# Patient Record
Sex: MALE | Race: WHITE | NOT HISPANIC OR LATINO | Employment: PART TIME | URBAN - METROPOLITAN AREA
[De-identification: names, ages, dates, MRNs, and addresses within clinical notes are randomized per-mention and may not be internally consistent; named-entity substitution may affect disease eponyms.]

---

## 2021-03-12 ENCOUNTER — APPOINTMENT (OUTPATIENT)
Dept: ULTRASOUND IMAGING | Facility: HOSPITAL | Age: 21
End: 2021-03-12

## 2021-03-12 ENCOUNTER — HOSPITAL ENCOUNTER (EMERGENCY)
Facility: HOSPITAL | Age: 21
Discharge: HOME | End: 2021-03-12
Admitting: EMERGENCY MEDICINE

## 2021-03-12 VITALS
SYSTOLIC BLOOD PRESSURE: 133 MMHG | DIASTOLIC BLOOD PRESSURE: 70 MMHG | WEIGHT: 198.19 LBS | BODY MASS INDEX: 28.37 KG/M2 | OXYGEN SATURATION: 99 % | TEMPERATURE: 97 F | RESPIRATION RATE: 20 BRPM | HEART RATE: 80 BPM | HEIGHT: 70 IN

## 2021-03-12 DIAGNOSIS — N50.812 PAIN IN BOTH TESTICLES: Primary | ICD-10-CM

## 2021-03-12 DIAGNOSIS — N43.3 HYDROCELE, BILATERAL: ICD-10-CM

## 2021-03-12 DIAGNOSIS — N50.811 PAIN IN BOTH TESTICLES: Primary | ICD-10-CM

## 2021-03-12 LAB
BILIRUB UR QL STRIP: NEGATIVE
CLARITY UR: CLEAR
COLOR UR: YELLOW
GLUCOSE UR STRIP-MCNC: NEGATIVE MG/DL
HGB UR QL STRIP.AUTO: NEGATIVE
KETONES UR QL STRIP: NEGATIVE
LEUKOCYTE ESTERASE UR QL STRIP.AUTO: NEGATIVE
NITRITE UR QL STRIP: NEGATIVE
PH UR STRIP.AUTO: 6.5 [PH] (ref 5–8)
PROT UR QL STRIP: NEGATIVE
SP GR UR STRIP: <=1.005 (ref 1–1.03)
UROBILINOGEN UR QL STRIP: NORMAL

## 2021-03-12 PROCEDURE — 99283 EMERGENCY DEPT VISIT LOW MDM: CPT

## 2021-03-12 PROCEDURE — 93976 VASCULAR STUDY: CPT

## 2021-03-12 PROCEDURE — 81003 URINALYSIS AUTO W/O SCOPE: CPT | Performed by: NURSE PRACTITIONER

## 2021-03-12 PROCEDURE — 76870 US EXAM SCROTUM: CPT

## 2021-03-13 NOTE — ED PROVIDER NOTES
Subjective   History: Patient is a 20-year-old male complains of bilateral scrotal pain for the last 2 days.  Reports its usually worse when he wakes up in the a.m. and more swollen improves throughout the day but then tonight states the pain was worse.  Denies any trauma.  Took Tylenol approximately 2 hours prior to arrival.  Reports it is a heaviness, nonradiating.  Has had some nausea with it.  Denies any history of testicular torsion.  No fever chills vomiting abdominal pain.  No penile discharge urinary frequency urgency or burning.      Onset: 2 days  Location: Bilateral scrotal  Duration: Waxes and wanes  Character: Edema  Aggravating/Alleviating factors: Worse when waking up in a.m.  Radiation nonradiating  Severity: Mild            Review of Systems   Constitutional: Negative for chills, fatigue and fever.   HENT: Negative for congestion, sore throat, tinnitus and trouble swallowing.    Eyes: Negative for photophobia, discharge and visual disturbance.   Respiratory: Negative for cough and shortness of breath.    Cardiovascular: Negative for chest pain.   Gastrointestinal: Negative for abdominal pain, diarrhea, nausea and vomiting.   Genitourinary: Positive for scrotal swelling and testicular pain. Negative for discharge, dysuria, frequency, hematuria, penile pain, penile swelling and urgency.   Musculoskeletal: Negative for back pain and myalgias.   Skin: Negative for rash.   Neurological: Negative for dizziness and headaches.   Psychiatric/Behavioral: Negative for confusion.       History reviewed. No pertinent past medical history.    No Known Allergies    History reviewed. No pertinent surgical history.    History reviewed. No pertinent family history.    Social History     Socioeconomic History   • Marital status: Single     Spouse name: Not on file   • Number of children: Not on file   • Years of education: Not on file   • Highest education level: Not on file   Tobacco Use   • Smoking status: Never  "Smoker   Substance and Sexual Activity   • Alcohol use: Never   • Drug use: Never           Objective   Physical Exam  Vitals reviewed. Exam conducted with a chaperone present.   Constitutional:       General: He is not in acute distress.     Appearance: Normal appearance. He is normal weight. He is not toxic-appearing.   HENT:      Head: Normocephalic and atraumatic.   Eyes:      Pupils: Pupils are equal, round, and reactive to light.   Cardiovascular:      Rate and Rhythm: Normal rate.      Pulses: Normal pulses.      Heart sounds: Normal heart sounds. No murmur.   Pulmonary:      Effort: Pulmonary effort is normal.      Breath sounds: Normal breath sounds.   Abdominal:      General: Abdomen is flat. Bowel sounds are normal. There is no distension.      Palpations: Abdomen is soft.      Tenderness: There is no abdominal tenderness. There is no guarding or rebound.   Genitourinary:     Pubic Area: No rash.       Penis: Normal.       Testes: Cremasteric reflex is present.         Right: Tenderness present. Swelling not present. Cremasteric reflex is present.          Left: Tenderness present. Swelling not present. Cremasteric reflex is present.       Comments: Subjective swelling per patient.  No erythema visible edema induration or fluctuation.  Tender on palpation right worse than left.  Musculoskeletal:         General: Normal range of motion.      Cervical back: Normal range of motion.   Skin:     General: Skin is warm and dry.      Findings: No rash.   Neurological:      Mental Status: He is alert and oriented to person, place, and time.   Psychiatric:         Mood and Affect: Mood normal.         Behavior: Behavior normal.         Thought Content: Thought content normal.         Judgment: Judgment normal.         Procedures           ED Course    /91 (BP Location: Left arm, Patient Position: Sitting)   Pulse 73   Temp 98.2 °F (36.8 °C) (Oral)   Resp 18   Ht 177.8 cm (70\")   Wt 89.9 kg (198 lb 3.1 " oz)   SpO2 100%   BMI 28.44 kg/m²   Labs Reviewed   URINALYSIS W/ CULTURE IF INDICATED - Normal    Narrative:     Urine microscopic not indicated.     Medications - No data to display  US Scrotum & Testicles    Result Date: 3/12/2021   1.  Small bilateral hydroceles. 2.  No evidence of intratesticular mass lesion.  No evidence of testicular torsion.  Electronically Signed By-Ramone Arellano MD On:3/12/2021 8:39 PM This report was finalized on [id number] by  Ramone Arellano MD.                                           MDM     I examined the patient using the appropriate personal protective equipment.      DISPOSITION:   Chart Review:  Comorbidity:  has no past medical history on file.    Labs: Urinalysis unremarkable    Imaging:   US Scrotum & Testicles    Result Date: 3/12/2021   1.  Small bilateral hydroceles. 2.  No evidence of intratesticular mass lesion.  No evidence of testicular torsion.  Electronically Signed By-Ramone Arellano MD On:3/12/2021 8:39 PM This report was finalized on [id number] by  Ramone Arellano MD.      Disposition/Treatment:  Urinalysis unremarkable.  Patient refused pain medication while in ER.  Ultrasound of scrotum revealed small bilateral hydroceles, likely idiopathic as patient denies any recent trauma, no infection present.  Discussed scrotal support ibuprofen for discomfort and follow-up with urology.  Patient and father at bedside agreeable to plan of care.    Prescription: None    Final diagnoses:   Pain in both testicles   Hydrocele, bilateral            Ivonne Charles, APRN  03/12/21 2184

## 2021-03-13 NOTE — ED NOTES
"Pt is c/o bilateral testicular pain that he states feels like \"heaviness\" that has been going on for about a day and is also tender to the touch. Pt is not c/o any other symptoms at this time.      Sandy Sharma, RN  03/12/21 2036    "

## 2021-03-13 NOTE — DISCHARGE INSTRUCTIONS
Use scrotal support and ibuprofen for discomfort.  Follow-up with urology   Natanael Carlin(Attending)

## 2021-03-29 ENCOUNTER — OFFICE VISIT (OUTPATIENT)
Dept: CARDIOLOGY | Facility: CLINIC | Age: 21
End: 2021-03-29

## 2021-03-29 VITALS
BODY MASS INDEX: 27.77 KG/M2 | WEIGHT: 194 LBS | OXYGEN SATURATION: 99 % | HEIGHT: 70 IN | DIASTOLIC BLOOD PRESSURE: 87 MMHG | SYSTOLIC BLOOD PRESSURE: 152 MMHG | HEART RATE: 73 BPM

## 2021-03-29 DIAGNOSIS — I10 ESSENTIAL HYPERTENSION: ICD-10-CM

## 2021-03-29 DIAGNOSIS — R00.2 PALPITATIONS: Primary | ICD-10-CM

## 2021-03-29 DIAGNOSIS — R55 NEAR SYNCOPE: ICD-10-CM

## 2021-03-29 DIAGNOSIS — R53.83 OTHER FATIGUE: ICD-10-CM

## 2021-03-29 PROCEDURE — 99204 OFFICE O/P NEW MOD 45 MIN: CPT | Performed by: INTERNAL MEDICINE

## 2021-03-29 NOTE — PROGRESS NOTES
Date of Office Visit: 2021  Encounter Provider: Dr. Momo Thompson    Place of Service: [facility] [place]  Patient Name: Brendon Joseph  :2000  Cristin Rolle MD    Chief Complaint   Patient presents with   • Consult   • Palpitations   • Syncope   • Fatigue   • Hypertension     History of Present Illness    I am pleased to see Mr. Lara in my office today as a new consultation    As you know, patient is 20 years old white gentleman whose past medical history is insignificant for any medical illness who is referred to me for symptom of dizziness and headache.    Patient reports that about a week ago he started having headaches and also feeling not well.  He felt dizzy.  Patient complains of fatigue and lack of energy.  Patient denies any chest pain.  Patient denies any orthopnea or PND or palpitation.  Patient does state that he is feeling thumping in the chest but heart is not racing.    Patient does not smoke or abuse alcohol.  No previous coronary artery disease or congenital heart disease.  No medical illness    EKG done in PCP office was normal.    I believe that his symptoms are probably due to high blood pressure.  However he never had high blood pressure previously.  His blood pressure 2 weeks ago was normal.  Before I suggest that patient should start antihypertensive, I would recommend more blood pressure readings.  I advised him to monitor the blood pressure and come to my office in 1 month.  I would also proceed with echocardiogram        Past Medical History:   Diagnosis Date   • Essential hypertension 3/29/2021   • Near syncope 3/29/2021   • Other fatigue 3/29/2021   • Palpitations 3/29/2021         History reviewed. No pertinent surgical history.        Current Outpatient Medications:   •  Acetaminophen (Tylenol) 325 MG capsule, Take  by mouth., Disp: , Rfl:       Social History     Socioeconomic History   • Marital status: Single     Spouse name: Not on file   • Number of  "children: Not on file   • Years of education: Not on file   • Highest education level: Not on file   Tobacco Use   • Smoking status: Never Smoker   • Smokeless tobacco: Never Used   Vaping Use   • Vaping Use: Never used   Substance and Sexual Activity   • Alcohol use: Never   • Drug use: Never   • Sexual activity: Defer         Review of Systems   Constitutional: Negative for chills and fever.   HENT: Negative for ear discharge and nosebleeds.    Eyes: Negative for discharge and redness.   Cardiovascular: Negative for chest pain, orthopnea, palpitations, paroxysmal nocturnal dyspnea and syncope.   Respiratory: Positive for shortness of breath. Negative for cough and wheezing.    Endocrine: Negative for heat intolerance.   Skin: Negative for rash.   Musculoskeletal: Negative for arthritis and myalgias.   Gastrointestinal: Negative for abdominal pain, melena, nausea and vomiting.   Genitourinary: Negative for dysuria and hematuria.   Neurological: Negative for dizziness, light-headedness, numbness and tremors.   Psychiatric/Behavioral: Negative for depression. The patient is not nervous/anxious.        Procedures    Procedures    No orders to display           Objective:    /87   Pulse 73   Ht 177.8 cm (70\")   Wt 88 kg (194 lb)   SpO2 99%   BMI 27.84 kg/m²         Constitutional:       Appearance: Well-developed.   Eyes:      General: No scleral icterus.        Right eye: No discharge.   HENT:      Head: Normocephalic and atraumatic.   Neck:      Thyroid: No thyromegaly.      Lymphadenopathy: No cervical adenopathy.   Pulmonary:      Effort: Pulmonary effort is normal. No respiratory distress.      Breath sounds: Normal breath sounds. No wheezing. No rales.   Cardiovascular:      Normal rate. Regular rhythm.      No gallop.   Abdominal:      Tenderness: There is no abdominal tenderness.   Skin:     Findings: No erythema or rash.   Neurological:      Mental Status: Alert and oriented to person, place, and " time.             Assessment:       Diagnosis Plan   1. Palpitations     2. Other fatigue     3. Near syncope     4. Essential hypertension              Plan:       MDM:    1.  Elevated blood pressure without diagnosis of hypertension:    His blood pressure is high but previously his blood pressures are normal.  I would recommend blood pressure monitoring for 2 to 3 weeks before I start antihypertensive regiment.  Proceed with echocardiogram    2.  Headache:    I advised the patient if his headache gets worse he probably needs CAT scan but I believe it is probably due to high blood pressure.  At present his headache free.    3.  Palpitation    He is heart is not racing fast but he feels strong beats in the chest which is probably due to high blood pressure

## 2021-04-26 ENCOUNTER — OFFICE VISIT (OUTPATIENT)
Dept: CARDIOLOGY | Facility: CLINIC | Age: 21
End: 2021-04-26

## 2021-04-26 ENCOUNTER — OUTSIDE FACILITY SERVICE (OUTPATIENT)
Dept: CARDIOLOGY | Facility: CLINIC | Age: 21
End: 2021-04-26

## 2021-04-26 VITALS
OXYGEN SATURATION: 100 % | HEIGHT: 70 IN | SYSTOLIC BLOOD PRESSURE: 159 MMHG | HEART RATE: 64 BPM | BODY MASS INDEX: 28.12 KG/M2 | DIASTOLIC BLOOD PRESSURE: 92 MMHG | WEIGHT: 196.4 LBS

## 2021-04-26 DIAGNOSIS — R00.2 PALPITATIONS: Primary | ICD-10-CM

## 2021-04-26 DIAGNOSIS — I10 ESSENTIAL HYPERTENSION: ICD-10-CM

## 2021-04-26 DIAGNOSIS — R55 NEAR SYNCOPE: ICD-10-CM

## 2021-04-26 PROCEDURE — 93306 TTE W/DOPPLER COMPLETE: CPT | Performed by: INTERNAL MEDICINE

## 2021-04-26 PROCEDURE — 99213 OFFICE O/P EST LOW 20 MIN: CPT | Performed by: INTERNAL MEDICINE

## 2021-04-26 RX ORDER — LOSARTAN POTASSIUM 25 MG/1
25 TABLET ORAL DAILY
Qty: 90 TABLET | Refills: 1 | Status: SHIPPED | OUTPATIENT
Start: 2021-04-26 | End: 2021-11-04 | Stop reason: SDUPTHER

## 2021-04-26 NOTE — PROGRESS NOTES
Date of Office Visit: 2021  Encounter Provider: Dr. Momo Thompson    Place of Service: [facility] [place]  Patient Name: Brendon Joseph  :2000  Cristin Rolle MD    Chief Complaint   Patient presents with   • Palpitations     Echo   • Hypertension   • Rapid Heart Rate     History of Present Illness    I am pleased to see Mr. Lara in my office today as a follow-up.    As you know, patient is 20 years old white gentleman whose past medical history is insignificant for any medical illness who was referred to me for symptom of dizziness and headache.    In 2021, patient was referred to me for symptom of dizziness and headaches.  His blood pressure was noted to be elevated.  I recommended to proceed with blood pressure monitoring for a month and bring the log book.  Echocardiogram was recommended.  Echocardiogram showed EF of 60%.  Mild/borderline LVH was noted.  No significant valvular heart disease.    Since the previous visit, patient is overall stable.  Still have occasional headaches.  He brought the blood pressure logbook and most of the blood pressures are in the range of 140s.  Patient denies any orthopnea or PND or syncope or presyncope.  No leg edema noted.    I would recommend to start losartan 25 mg daily.  Blood pressure monitoring is recommended.  I will see the patient in 6 months.      Past Medical History:   Diagnosis Date   • Essential hypertension 3/29/2021   • Near syncope 3/29/2021   • Other fatigue 3/29/2021   • Palpitations 3/29/2021         History reviewed. No pertinent surgical history.        Current Outpatient Medications:   •  Acetaminophen (Tylenol) 325 MG capsule, Take  by mouth., Disp: , Rfl:   •  losartan (Cozaar) 25 MG tablet, Take 1 tablet by mouth Daily., Disp: 90 tablet, Rfl: 1      Social History     Socioeconomic History   • Marital status: Single     Spouse name: Not on file   • Number of children: Not on file   • Years of education: Not on file  "  • Highest education level: Not on file   Tobacco Use   • Smoking status: Never Smoker   • Smokeless tobacco: Never Used   Vaping Use   • Vaping Use: Never used   Substance and Sexual Activity   • Alcohol use: Never   • Drug use: Never   • Sexual activity: Defer         Review of Systems   Constitutional: Negative for chills and fever.   HENT: Negative for ear discharge and nosebleeds.    Eyes: Negative for discharge and redness.   Cardiovascular: Negative for chest pain, orthopnea, palpitations, paroxysmal nocturnal dyspnea and syncope.   Respiratory: Negative for cough, shortness of breath and wheezing.    Endocrine: Negative for heat intolerance.   Skin: Negative for rash.   Musculoskeletal: Negative for arthritis and myalgias.   Gastrointestinal: Negative for abdominal pain, melena, nausea and vomiting.   Genitourinary: Negative for dysuria and hematuria.   Neurological: Negative for dizziness, light-headedness, numbness and tremors.   Psychiatric/Behavioral: Negative for depression. The patient is not nervous/anxious.        Procedures    Procedures    No orders to display           Objective:    /92   Pulse 64   Ht 177.8 cm (70\")   Wt 89.1 kg (196 lb 6.4 oz)   SpO2 100%   BMI 28.18 kg/m²         Constitutional:       Appearance: Well-developed.   Eyes:      General: No scleral icterus.        Right eye: No discharge.   HENT:      Head: Normocephalic and atraumatic.   Neck:      Thyroid: No thyromegaly.      Lymphadenopathy: No cervical adenopathy.   Pulmonary:      Effort: Pulmonary effort is normal. No respiratory distress.      Breath sounds: Normal breath sounds. No wheezing. No rales.   Cardiovascular:      Normal rate. Regular rhythm.      No gallop.   Abdominal:      Tenderness: There is no abdominal tenderness.   Skin:     Findings: No erythema or rash.   Neurological:      Mental Status: Alert and oriented to person, place, and time.             Assessment:       Diagnosis Plan   1. " Palpitations  losartan (Cozaar) 25 MG tablet   2. Near syncope  losartan (Cozaar) 25 MG tablet   3. Essential hypertension  losartan (Cozaar) 25 MG tablet            Plan:       MDM:    1.  Hypertension:    I would start losartan 25 mg daily blood pressure monitoring is recommended.    2.  Palpitation:    I suspect this is probably due to elevated blood pressure.  Control of blood pressure    3.  Near syncope:    Patient has not had any further episodes.

## 2021-10-22 NOTE — PROGRESS NOTES
Date of Office Visit: 10/25/2021  Encounter Provider: Dr. Momo Thompson  Place of Service: [facility] [place]  Patient Name: Brendon Joseph  :2000  Cristin Rolle MD    Chief Complaint   Patient presents with   • Follow-up   • Palpitations     History of Present Illness    I am pleased to see Mr. Lara in my office today as a follow-up.    As you know, patient is 21 years old white gentleman whose past medical history is insignificant for any medical illness who came today for follow-up.    In 2021, patient was presented to me for symptom of dizziness and headache.  He was found to have elevated blood pressure.  He was advised to have monitoring at home and bring the log book and was found to have consistent elevation of blood pressure.  Echocardiogram showed EF was 60% and mild LVH were noted.  Patient was started on losartan 25 mg daily.    Since the previous visit, patient is doing well.  His blood pressure is better.  Patient denies any headaches.  Patient denies any dizziness.  No other symptoms.    At this stage I will continue current treatment.  Patient is advised to take losartan at daytime.  Patient is advised to monitor the blood pressure.  I would consider TMT and CT angiogram of renal arteries in future.        Past Medical History:   Diagnosis Date   • Essential hypertension 3/29/2021   • Near syncope 3/29/2021   • Other fatigue 3/29/2021   • Palpitations 3/29/2021         History reviewed. No pertinent surgical history.        Current Outpatient Medications:   •  losartan (Cozaar) 25 MG tablet, Take 1 tablet by mouth Daily., Disp: 90 tablet, Rfl: 1  •  Acetaminophen (Tylenol) 325 MG capsule, Take  by mouth., Disp: , Rfl:       Social History     Socioeconomic History   • Marital status: Single   Tobacco Use   • Smoking status: Never Smoker   • Smokeless tobacco: Never Used   Vaping Use   • Vaping Use: Never used   Substance and Sexual Activity   • Alcohol use: Never   •  "Drug use: Never   • Sexual activity: Defer         Review of Systems   Constitutional: Negative for chills and fever.   HENT: Negative for ear discharge and nosebleeds.    Eyes: Negative for discharge and redness.   Cardiovascular: Negative for chest pain, orthopnea, palpitations, paroxysmal nocturnal dyspnea and syncope.   Respiratory: Negative for cough, shortness of breath and wheezing.    Endocrine: Negative for heat intolerance.   Skin: Negative for rash.   Musculoskeletal: Negative for arthritis and myalgias.   Gastrointestinal: Negative for abdominal pain, melena, nausea and vomiting.   Genitourinary: Negative for dysuria and hematuria.   Neurological: Negative for dizziness, light-headedness, numbness and tremors.   Psychiatric/Behavioral: Negative for depression. The patient is not nervous/anxious.        Procedures    Procedures    No orders to display           Objective:    /81   Pulse 57   Ht 177.8 cm (70\")   Wt 96.2 kg (212 lb)   SpO2 99%   BMI 30.42 kg/m²         Constitutional:       Appearance: Well-developed.   Eyes:      General: No scleral icterus.        Right eye: No discharge.   HENT:      Head: Normocephalic and atraumatic.   Neck:      Thyroid: No thyromegaly.      Lymphadenopathy: No cervical adenopathy.   Pulmonary:      Effort: Pulmonary effort is normal. No respiratory distress.      Breath sounds: Normal breath sounds. No wheezing. No rales.   Cardiovascular:      Normal rate. Regular rhythm.      No gallop.   Abdominal:      Tenderness: There is no abdominal tenderness.   Skin:     Findings: No erythema or rash.   Neurological:      Mental Status: Alert and oriented to person, place, and time.             Assessment:       Diagnosis Plan   1. Palpitations     2. Essential hypertension     3. Near syncope              Plan:       MDM:    1.  Hypertension:    His blood pressure is better.  I will continue to observe.  Consider stress test and CT of renal arteries without renal " artery stenosis    2.  Palpitation:    Symptom of palpitations are contained

## 2021-10-25 ENCOUNTER — OFFICE VISIT (OUTPATIENT)
Dept: CARDIOLOGY | Facility: CLINIC | Age: 21
End: 2021-10-25

## 2021-10-25 VITALS
WEIGHT: 212 LBS | HEART RATE: 57 BPM | HEIGHT: 70 IN | DIASTOLIC BLOOD PRESSURE: 81 MMHG | OXYGEN SATURATION: 99 % | SYSTOLIC BLOOD PRESSURE: 131 MMHG | BODY MASS INDEX: 30.35 KG/M2

## 2021-10-25 DIAGNOSIS — R00.2 PALPITATIONS: Primary | ICD-10-CM

## 2021-10-25 DIAGNOSIS — R55 NEAR SYNCOPE: ICD-10-CM

## 2021-10-25 DIAGNOSIS — I10 ESSENTIAL HYPERTENSION: ICD-10-CM

## 2021-10-25 PROCEDURE — 99213 OFFICE O/P EST LOW 20 MIN: CPT | Performed by: INTERNAL MEDICINE

## 2021-11-04 DIAGNOSIS — R55 NEAR SYNCOPE: ICD-10-CM

## 2021-11-04 DIAGNOSIS — I10 ESSENTIAL HYPERTENSION: ICD-10-CM

## 2021-11-04 DIAGNOSIS — R00.2 PALPITATIONS: ICD-10-CM

## 2021-11-04 RX ORDER — LOSARTAN POTASSIUM 25 MG/1
25 TABLET ORAL DAILY
Qty: 90 TABLET | Refills: 1 | Status: SHIPPED | OUTPATIENT
Start: 2021-11-04 | End: 2022-06-08

## 2022-02-08 ENCOUNTER — TELEPHONE (OUTPATIENT)
Dept: CARDIOLOGY | Facility: CLINIC | Age: 22
End: 2022-02-08

## 2022-02-08 ENCOUNTER — OFFICE VISIT (OUTPATIENT)
Dept: CARDIOLOGY | Facility: CLINIC | Age: 22
End: 2022-02-08

## 2022-02-08 VITALS
WEIGHT: 212 LBS | SYSTOLIC BLOOD PRESSURE: 122 MMHG | HEIGHT: 70 IN | BODY MASS INDEX: 30.35 KG/M2 | OXYGEN SATURATION: 99 % | DIASTOLIC BLOOD PRESSURE: 78 MMHG | HEART RATE: 63 BPM

## 2022-02-08 DIAGNOSIS — R00.2 PALPITATIONS: ICD-10-CM

## 2022-02-08 DIAGNOSIS — R07.89 OTHER CHEST PAIN: Primary | ICD-10-CM

## 2022-02-08 DIAGNOSIS — I10 ESSENTIAL HYPERTENSION: ICD-10-CM

## 2022-02-08 PROCEDURE — 93000 ELECTROCARDIOGRAM COMPLETE: CPT | Performed by: NURSE PRACTITIONER

## 2022-02-08 PROCEDURE — 99213 OFFICE O/P EST LOW 20 MIN: CPT | Performed by: NURSE PRACTITIONER

## 2022-02-08 RX ORDER — CALCIUM CARBONATE 200(500)MG
1 TABLET,CHEWABLE ORAL DAILY
COMMUNITY

## 2022-02-08 NOTE — TELEPHONE ENCOUNTER
Patients mother called and stated that the patient was having some chest tightness last night the patient claims he is feeling somewhat better this morning. Patient was watching TV when this occurred last night his girlfriend took his blood pressure and told the patients mother that it wasn't very high. Patients mother would like a call back at [phone]

## 2022-02-13 NOTE — PROGRESS NOTES
Cardiology Office Follow Up Visit      Primary Care Provider:  Cristin Rolle MD    Reason for f/u:     Chest pain  Palpitations  Hypertension      Subjective     CC:    Isolated episodes of chest pain associated with palpitations    History of Present Illness       Brendon Joseph is a 21 y.o. male.  Patient is a very pleasant 21-year-old male who was recently evaluated by Dr. Thompson with episodes ofDizziness and headache and found to have elevated blood pressure.    He had an echocardiogram which showed is action fraction to be 60% with mild LVH he was started on losartan 25 mg daily.    The patient reports he had an episode of palpitations associated with chest pain the other night while watching TV.  He was not exerting himself he began to feel like his heart was beating hard.  There was some fleeting chest pain and heart pounding.  His fiancée is in school to be an RN.  She checks his blood pressure and heart rate which were reported to be okay.    He reports his blood pressure has been doing better since starting on losartan and in our office today his blood pressure is well controlled.  He denies any recent exertional chest pain or dyspnea      Past Medical History:   Diagnosis Date   • Essential hypertension 3/29/2021   • Near syncope 3/29/2021   • Other fatigue 3/29/2021   • Palpitations 3/29/2021       History reviewed. No pertinent surgical history.      Current Outpatient Medications:   •  Acetaminophen (Tylenol) 325 MG capsule, Take  by mouth., Disp: , Rfl:   •  calcium carbonate (Tums) 500 MG chewable tablet, Chew 1 tablet Daily., Disp: , Rfl:   •  losartan (Cozaar) 25 MG tablet, Take 1 tablet by mouth Daily., Disp: 90 tablet, Rfl: 1    Social History     Socioeconomic History   • Marital status: Single   Tobacco Use   • Smoking status: Never Smoker   • Smokeless tobacco: Never Used   Vaping Use   • Vaping Use: Never used   Substance and Sexual Activity   • Alcohol use: Never   • Drug use: Never   •  "Sexual activity: Defer       History reviewed. No pertinent family history.    The following portions of the patient's history were reviewed and updated as appropriate: allergies, current medications, past family history, past medical history, past social history, past surgical history and problem list.    Review of Systems   Constitutional: Negative for decreased appetite and diaphoresis.   HENT: Negative for congestion, hearing loss and nosebleeds.    Cardiovascular: Positive for chest pain and palpitations. Negative for claudication, dyspnea on exertion, irregular heartbeat, leg swelling, near-syncope, orthopnea, paroxysmal nocturnal dyspnea and syncope.   Respiratory: Negative for cough, shortness of breath and sleep disturbances due to breathing.    Endocrine: Negative for polyuria.   Hematologic/Lymphatic: Does not bruise/bleed easily.   Skin: Negative for itching and rash.   Musculoskeletal: Negative for back pain, muscle weakness and myalgias.   Gastrointestinal: Negative for abdominal pain, change in bowel habit and nausea.   Genitourinary: Negative for dysuria, flank pain, frequency and hesitancy.   Neurological: Negative for dizziness, tremors and weakness.   Psychiatric/Behavioral: Negative for altered mental status. The patient does not have insomnia.      /78   Pulse 63   Ht 177.8 cm (70\")   Wt 96.2 kg (212 lb)   SpO2 99%   BMI 30.42 kg/m² .  Objective     Vitals reviewed.   Constitutional:       General: Not in acute distress.     Appearance: Normal appearance. Well-developed.   Eyes:      Pupils: Pupils are equal, round, and reactive to light.   HENT:      Head: Normocephalic and atraumatic.   Neck:      Vascular: No JVD.   Pulmonary:      Effort: Pulmonary effort is normal.      Breath sounds: Normal breath sounds.   Cardiovascular:      Normal rate. Regular rhythm.   Pulses:     Intact distal pulses.   Edema:     Peripheral edema absent.   Abdominal:      General: There is no distension. "      Palpations: Abdomen is soft.      Tenderness: There is no abdominal tenderness.   Musculoskeletal: Normal range of motion.      Cervical back: Normal range of motion and neck supple. Skin:     General: Skin is warm and dry.   Neurological:      Mental Status: Alert and oriented to person, place, and time.             ECG 12 Lead    Date/Time: 2/8/2022 11:14 AM  Performed by: Kristin Choudhury APRN  Authorized by: Kristin Choudhury APRN   Comparison: not compared with previous ECG   Rhythm: sinus rhythm  Rate: normal  BPM: 63  Conduction: conduction normal  ST Segments: ST segments normal  QRS axis: normal  Other: no other findings    Clinical impression: normal ECG            EKG ordered by and reviewed by me in office         Diagnoses and all orders for this visit:    1. Other chest pain (Primary)  Comments:  Patient reports an episode of chest pain associated with palpitations that occurred while he was watching television.  Orders:  -     Treadmill Stress Test; Future    2. Palpitations  Comments:  Recent episode of palpitations that occurred while watching TV that felt like a hard heartbeat associated with chest pain  Orders:  -     Cancel: Holter Monitor - 24 Hour; Future  -     Holter Monitor - 24 Hour; Future    3. Essential hypertension  Comments:  Blood pressure is stable on current medical therapy           MEDICAL DECISION MAKING:      Patient had an isolated episode of palpitations associated with chest pain.    His EKG is normal with no ectopic beats.  There is no ST or T wave segments changes to suggest ischemia    His blood pressure is stable on current medical therapy.    At this time I would like to proceed with placing a 24-hour Holter monitor to rule out any arrhythmia.  In addition I have scheduled him back for an exercise treadmill test to rule out ischemic changes.    Additional recommendations be made pending review of these tests.  The patient has been advised to try to avoid caffeine  follow-up will be determined after review of these test

## 2022-02-20 DIAGNOSIS — R00.1 BRADYCARDIA, SINUS: Primary | ICD-10-CM

## 2022-02-21 ENCOUNTER — TELEPHONE (OUTPATIENT)
Dept: CARDIOLOGY | Facility: CLINIC | Age: 22
End: 2022-02-21

## 2022-02-21 NOTE — TELEPHONE ENCOUNTER
Per dr miller patient needs a sleep study per and patient is aware of this   Patient is having bradycardia over night

## 2022-02-28 ENCOUNTER — TELEPHONE (OUTPATIENT)
Dept: CARDIOLOGY | Facility: CLINIC | Age: 22
End: 2022-02-28

## 2022-06-08 DIAGNOSIS — R00.2 PALPITATIONS: ICD-10-CM

## 2022-06-08 DIAGNOSIS — R55 NEAR SYNCOPE: ICD-10-CM

## 2022-06-08 DIAGNOSIS — I10 ESSENTIAL HYPERTENSION: ICD-10-CM

## 2022-06-08 RX ORDER — LOSARTAN POTASSIUM 25 MG/1
25 TABLET ORAL DAILY
Qty: 90 TABLET | Refills: 1 | Status: SHIPPED | OUTPATIENT
Start: 2022-06-08